# Patient Record
(demographics unavailable — no encounter records)

---

## 2024-10-20 NOTE — HISTORY OF PRESENT ILLNESS
[FreeTextEntry1] : 2 weeks of sinus infection treated with antibiotics and steroids  Then sudden development of right auricular/jaw pain with weakness of the right face, paresthesias of the tongue, pain in the ear and pain radiating into the right neck.  1 week in [facility] [place] hospital with antibiotics and steroids.   Right lower face, neck muscles with fasciculations. Significant right ear pain.

## 2024-10-20 NOTE — DISCUSSION/SUMMARY
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  I have personally reviewed available neuroradiological images. MRI head 10/2023 w/wo contrast - no acute findings, no significant enhancement of the IAC or cranial nerves seen. Parotid gland is only partially visualized.  CTA head and neck - no significant abnormalities.   Historical (From [facility] note 8/8/2023): Nutcracker syndrome (open left renal vein transposition to IVC, venoplasty left renal vein anastomosis to IVC and coil embolization/sclerotherapy of left ovarian vein and pelvic varices, coil left ovarian vein, bilateral uterine artery particle embolization) TTE 5/2023: Normal atrial size, hypermobile interatrial septum, bubble study negative for PFO. RUPAL 6/28/20203 - Small amount of bubbles in LA, small PFO.  8/2023- s/p PFO closure. Started on estrogen as well. Improved migraines.   Historical neuro: Hypercoagulable testing was initiated and completed in the hospital setting.  SAI negative, lupus anticoagulant negative DRVVT negative, silica clotting time negative, beta-2 glycoprotein negative, factor V Leiden, prothrombin gene, protein C and S, Antithrombin III all negative/normal.  Differential considerations for ear pain/facial pain/fasciculations drooping - appears peripheral and less likely central - hemifacial spasm, epileptiform etiology, autoimmune related, peripheral compressive or inflammatory neuropathy, infectious related, paraneoplastic.   RECOMMENDATION; 1. Check TSH, T4, thyroglobulin and thyroid peroxidase antibodies, SAI, CRP, ESR.  2. EEG  3. MRI neck soft tissues 4. MRI cervical spine 5. ENT eval 6. Gabapentin 300mg TID 7. Carbamazepine 200mg daily is reasonable (she had this prescribed in the past for trigeminal neuralgia).   Patient instructed to seek medical attention if symptoms worsening.     Follow up 2 weeks.

## 2024-10-20 NOTE — DISCUSSION/SUMMARY
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  I have personally reviewed available neuroradiological images. MRI head 10/2023 w/wo contrast - no acute findings, no significant enhancement of the IAC or cranial nerves seen. Parotid gland is only partially visualized.  CTA head and neck - no significant abnormalities.   Historical (From [facility] note 8/8/2023): Nutcracker syndrome (open left renal vein transposition to IVC, venoplasty left renal vein anastomosis to IVC and coil embolization/sclerotherapy of left ovarian vein and pelvic varices, coil left ovarian vein, bilateral uterine artery particle embolization) TTE 5/2023: Normal atrial size, hypermobile interatrial septum, bubble study negative for PFO. RUPAL 6/28/20203 - Small amount of bubbles in LA, small PFO.  8/2023- s/p PFO closure. Started on estrogen as well. Improved migraines.   Historical neuro: Hypercoagulable testing was initiated and completed in the hospital setting.  SAI negative, lupus anticoagulant negative DRVVT negative, silica clotting time negative, beta-2 glycoprotein negative, factor V Leiden, prothrombin gene, protein C and S, Antithrombin III all negative/normal.  Differential considerations for ear pain/facial pain/fasciculations drooping - appears peripheral and less likely central - hemifacial spasm, epileptiform etiology, autoimmune related, peripheral compressive or inflammatory neuropathy, infectious related, paraneoplastic.   RECOMMENDATION; 1. Check TSH, T4, thyroglobulin and thyroid peroxidase antibodies, SAI, CRP, ESR.  2. EEG  3. MRI neck soft tissues 4. MRI cervical spine 5. ENT eval 6. Gabapentin 300mg TID 7. Carbamazepine 200mg daily is reasonable (she had this prescribed in the past for trigeminal neuralgia).   Patient instructed to seek medical attention if symptoms worsening.     Follow up 2 weeks.

## 2024-10-20 NOTE — PHYSICAL EXAM
[FreeTextEntry1] : GENERAL APPEARANCE: Well developed, well nourished woman in pain CARDIOVASCULAR: Regular rate and rhythm HEENT: right lower facial fasciulations, right scalene/platisma fasciculations observed. Also noted skin blotching on right lower face and neck. fullness of neck at anterior base soft tissues. Mild tenderness to palpation of the right tragus and right ear. Otoscope examination of right external auditory canal is unremarkable.    NEUROLOGIC EXAM: MENTAL STATUS: Alert and Oriented to person, place and time. Speech is fluent, without aphasia or dysarthria Behavior and affect appropriate to situation.                         CRANIAL NERVES: CN 2:    Visual fields are full to confrontation. CN 3, 4, 6: Extraocular movements are intact. No nystagmus or ophthalmoplegia is evident. Pupils are equally round and reactive to light. CN 5:     Facial sensation is intact to light touch in all 3 divisions CN 7:     Facial excursion is full and symmetric bilaterally, but at rest with mild facial droop.  CN 8:   Hearing intact OU to finger rub.  MOTOR: Strength is 5/5 throughout for age and stature. No orbiting or drift noted. SENSORY: Intact to light touch perception in all four extremities without extinction to double simultaneous stimulation COORD: Finger to nose testing without dysmetria bilaterally. GAIT: Normal station and gait.

## 2024-10-20 NOTE — HISTORY OF PRESENT ILLNESS
[FreeTextEntry1] : 2 weeks of sinus infection treated with antibiotics and steroids  Then sudden development of right auricular/jaw pain with weakness of the right face, paresthesias of the tongue, pain in the ear and pain radiating into the right neck.  1 week in [facility] with antibiotics and steroids.   Right lower face, neck muscles with fasciculations. Significant right ear pain.

## 2024-10-30 NOTE — PHYSICAL EXAM
[FreeTextEntry1] : GENERAL PHYSICAL EXAM: GEN: no distress, normal affect EYES: sclera white, conjunctiva clear, no nystagmus PULM: no respiratory distress EXT: no edema, no cyanosis MSK: muscle tone and strength normal SKIN: warm, dry, no rash or lesion on exposed skin   NEUROLOGICAL EXAM: Mental Status Orientation: alert and oriented to person, place, time, and situation Language: clear and fluent, intact comprehension and repetition   Cranial Nerves II: visual fields full to confrontation III, IV, VI: PERRL, EOMI V, VII: facial sensation and movement intact, altered sensation on R side along V1, and V2, intermittent twitching at corner of the mouth as well as eyelid myokymia  VIII: hearing intact IX, X: uvula midline, soft palate elevates normally XI: BL shoulder shrug intact XII: tongue midline   Motor Bilateral muscle strength 5/5 in UE and LE, proximally and distally Tone and bulk are normal in upper and lower limbs   Sensation Intact to light touch in all 4 EXTs  Coordination Normal FTN bilaterally   Gait Normal stance, stride, and pivot turn

## 2024-10-30 NOTE — HISTORY OF PRESENT ILLNESS
[FreeTextEntry1] : Oct 30: Patient here for follow-up of trigeminal neuralgia.  She was last seen January 2024 by Samra Collins.  She started on carbamazepine 100 mg every 12 hours, prednisone taper, and completed course of Valtrex. About a month ago she had a sinus infection which triggered her facial pain. She had facial drooping. She went to the ER  and was treated for stroke again with thrombolytic. She had another course of steroids. She was seen at [facility] by Dr. Eduardo. She had a spinal tap and MRIs. She will still sometimes get twitching, pulling sensation, ear pain deep inside, fullness. She feels like her throat is swollen, will feel a lump on the right side, has difficulty swallowing. She has videos of right sided neck twitching particularly when opening the mouth or protruding her tongue , redness on the side of her face and neck. When bad things can sound muffled on the right side, or will have sounds that usually aren't there. There can also be excessive dryness or salivation on the right side with attacks. There can also be blurred vision.  She had kidney transplant surgery for Nutcracker syndrome in July. 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    HPI per chart: Fifi Alcala is a 41YO female, presenting today with trigeminal neuralgia.  Past medical history of CVA and PFO with closure.  Patient reports episode of trigeminal neuralgia and migraines occurring just after stroke. Patient reports that 2 weeks ago she received trigger point injections from pain management with Dr. Almonte, for head and neck pain and migraines.  Later that day she developed severe trigeminal neuralgia on the right side of the head starting at the ear getting to the face.  Patient was unaware of what it was at that time and pain after few days of the pain went to the emergency room for fear of second stroke.  Workup was negative and was told that it may be the start of shingles.  The next day the pain was really bad and she felt like her ear was red so she went to the walk-in who started her on Valtrex in case it was shingles.  Patient reports no rashes or blisters at any point.  She is currently taking gabapentin and Percocet for the pain but has not noted any improvement.  Pain is localized to the back of the right side of the head going across the top and across the ear into the cheek.  Pain does not extend down to the jaw.  Pain is a shooting stabbing pain.  Patient also reports that she had a recent sinus infection and was on a course of amoxicillin for this that is complete at this time. No other neurological complaints at this time. Pt denies any stroke like symptoms, including facial droop, numbness or weakness.

## 2024-10-30 NOTE — ASSESSMENT
[FreeTextEntry1] : Nina Wilhelm is a 40-year-old female presenting today with right sided facial pain, ear pain, and intermittent eye, neck and mouth twitching.  Patient does have a past medical history of CVA with PFO with closure.  Patient had workup for secondary stroke which was negative.  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 MRI brain, CT cervical spine and soft tissue neck were reviewed and did not show any abnormal lesion or vascular abnormality. There was left sided foraminal stenosis.      Plan: - No further imaging of benefit - amitriptyline start 10mg  , gradually increase to as tolerated. Max 50mg.  - Gabapentin 600mg as needed.  - Medrol dose pack for flair.  - Follow-up in 2 months.       Discussed common side effects of prescribed medications and potential alternatives.          Counseled patient on the importance of maintaining a healthy lifestyle, including balanced diet, adequate hydration, stress management, proper sleep hygiene, and physical activity.  Answered all questions and concerns to the best of my ability. Advised to call for any new or worsening symptoms.      In order to maintain continuity of care/prescription refills, patients must be seen on a yearly basis.   Total time spent on the day of the visit, including pre-visit and post-visit time was 35 minutes.

## 2024-11-26 NOTE — HISTORY OF PRESENT ILLNESS
[LMP unknown] : LMP unknown [N] : Patient does not use contraception [Y] : Positive pregnancy history [unknown] : Patient is unsure of the date of her LMP [Currently Active] : currently active [FreeTextEntry1] : Fifi is her for follow up on her need for HRT. She has surgical menopause after the total hysterectomy and BSO which was initially performed for severe pelvic congestion syndrome. She was subsequently diagnosed with Nutcracker syndrome, and she had her kidney repositioned. She is doing better but she has some right sided pain which may be adhesion related. She has significant vaginal dryness, and she will use Estrace cream as directed in the vulvovaginal area. She will remain on the Estradiol gel transdermal at 1.25 mg daily.  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 She will apply clobetasol as directed nightly for 4-6 weeks. We discussed the nature of LS and the tendency for it to wax and wane.  We discussed the use of a vibrator or vaginal dilator to help with accommodation.  All questions were answered. She does not need a pap test as he has no cervix and no prior h/o cervical disease.  During this visit comprehensive counseling was given regarding the following concerns:   1 We discussed the need for proper nutrition, hydration and exercise.  This includes cardiovascular and pelvic floor exercises.   2 We discussed the importance of maintaining a proper vaccination schedule and we discussed the utility of the currently available vaccines (Flu ,TDaP, Shingles, Covid 19 booster, RSV and Gardasil 9).   3 We discussed the impact of chronic sun exposure and the risk for skin disease as a result. The benefits of a total body scan by a Dermatologist were reviewed, along with sun-protective clothing, eye wear and sunblock.   4 We discussed the need for certain supplements for most people which include vitamin D3 (2000 IU daily), calcium rich foods with limitation on calcium supplementation by tabular form to 600 mg daily.  As part of a bone health program, we recommend weightbearing exercises, and some limited sun exposure (10-15 minutes daily) to help convert vitamin D to its active form.   5) We discussed the importance of an eye exam with dilation and retinal and corneal examination.   6) Mental health is very important and often times under served. We discussed the need for downtime, stress reduction and in the case of feelings such as profound sadness, suicidal or other unusual thoughts--the need to reach out for help.   7) Gun violence is a leading cause of death and injury in children, and it is important to focus on proper storage, gun locks and education of family members if there are guns in the home.   [Mammogramdate] : 11/13/2024 [TextBox_19] : BR1 [PapSmeardate] : 04/13/2021 [TextBox_31] : NEG [ColonoscopyDate] : 2023 [TextBox_43] : as per pt  [HPVDate] : 04/13/2021 [TextBox_78] : NEG [PGHxTotal] : 5 [[facility]] : 4 [[facility]] : 4 [PGHxABSpont] : 1

## 2024-11-26 NOTE — HISTORY OF PRESENT ILLNESS
[LMP unknown] : LMP unknown [N] : Patient does not use contraception [Y] : Positive pregnancy history [unknown] : Patient is unsure of the date of her LMP [Currently Active] : currently active [FreeTextEntry1] : Fifi is her for follow up on her need for HRT. She has surgical menopause after the total hysterectomy and BSO which was initially performed for severe pelvic congestion syndrome. She was subsequently diagnosed with Nutcracker syndrome, and she had her kidney repositioned. She is doing better but she has some right sided pain which may be adhesion related. She has significant vaginal dryness, and she will use Estrace cream as directed in the vulvovaginal area. She will remain on the Estradiol gel transdermal at 1.25 mg daily.  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 She will apply clobetasol as directed nightly for 4-6 weeks. We discussed the nature of LS and the tendency for it to wax and wane.  We discussed the use of a vibrator or vaginal dilator to help with accommodation.  All questions were answered. She does not need a pap test as he has no cervix and no prior h/o cervical disease.  During this visit comprehensive counseling was given regarding the following concerns:   1 We discussed the need for proper nutrition, hydration and exercise.  This includes cardiovascular and pelvic floor exercises.   2 We discussed the importance of maintaining a proper vaccination schedule and we discussed the utility of the currently available vaccines (Flu ,TDaP, Shingles, Covid 19 booster, RSV and Gardasil 9).   3 We discussed the impact of chronic sun exposure and the risk for skin disease as a result. The benefits of a total body scan by a Dermatologist were reviewed, along with sun-protective clothing, eye wear and sunblock.   4 We discussed the need for certain supplements for most people which include vitamin D3 (2000 IU daily), calcium rich foods with limitation on calcium supplementation by tabular form to 600 mg daily.  As part of a bone health program, we recommend weightbearing exercises, and some limited sun exposure (10-15 minutes daily) to help convert vitamin D to its active form.   5) We discussed the importance of an eye exam with dilation and retinal and corneal examination.   6) Mental health is very important and often times under served. We discussed the need for downtime, stress reduction and in the case of feelings such as profound sadness, suicidal or other unusual thoughts--the need to reach out for help.   7) Gun violence is a leading cause of death and injury in children, and it is important to focus on proper storage, gun locks and education of family members if there are guns in the home.   [Mammogramdate] : 11/13/2024 [TextBox_19] : BR1 [PapSmeardate] : 04/13/2021 [TextBox_31] : NEG [ColonoscopyDate] : 2023 [TextBox_43] : as per pt  [HPVDate] : 04/13/2021 [TextBox_78] : NEG [PGHxTotal] : 5 [[facility]] : 4 [[facility]xLiving] : 4 [PGHxABSpont] : 1

## 2024-12-09 NOTE — PHYSICAL EXAM
[Normal Breath Sounds] : Normal breath sounds [Normal Heart Sounds] : normal heart sounds [Anxious] : anxious [JVD] : no jugular venous distention  [de-identified] : Ambulating without difficulty [de-identified] : Within normal limits nonicteric [de-identified] : Soft moderate tenderness in the left lower quadrant no hernias present slight ecchymosis no peritoneal signs no masses wounds clean dry and intact [de-identified] : Full range of motion [de-identified] : Mild ecchymosis near incisions [de-identified] : Cranial nerves II through XII grossly intact

## 2024-12-09 NOTE — HISTORY OF PRESENT ILLNESS
[de-identified] : Fifi is a 39 y/o female here for a follow up. S/p repair of jejunal duodenal intussusception and ventral hernia repair x 2 on October 5, 2023 with Dr. Gramajo. Was doing well post up then began developing intermittent projectile vomiting of bilious fluid postprandially.  Saw Dr. Molina 10/2023 at which time a CT was ordered - negative Symptoms continued - went to ED - CT negative  CT A/P on 5/16/24: LOWER CHEST: Partially imaged atrial septal occlusion device. LIVER: Normal morphology. Unchanged subcentimeter hypodensity in the left lateral hepatic lobe, too small to characterize. BILE DUCTS: Normal caliber. GALLBLADDER: Cholecystectomy. SPLEEN: Within normal limits. PANCREAS: Within normal limits. ADRENALS: Bilateral adrenal gland nodules/nodular thickening is unchanged. KIDNEYS/URETERS: Within normal limits. BLADDER: Within normal limits. REPRODUCTIVE ORGANS: Hysterectomy. BOWEL: Duodenojejunal anastomosis. No bowel obstruction. Appendix is normal. PERITONEUM: No ascites. VESSELS: Left ovarian vein embolization material. Unchanged reduced aortomesenteric angle of the SMA in keeping with history of SMA syndrome. Post surgical changes interposed at the aorta and IVC at level of the left renal vein. Trace atherosclerotic changes of the infrarenal aorta. Mild collateralization of vessels in the ventral abdominal wall. RETROPERITONEUM/LYMPH NODES: No lymphadenopathy. ABDOMINAL WALL: Postsurgical changes of the ventral wall. BONES: Degenerative changes. IMPRESSION: No acute findings to explain patient's lower abdominal pain.  CT A/P on 12/23/23: BOWEL: Stable postsurgical changes of duodenojejunostomy. No bowel obstruction. Normal appendix. No acute pathology. No bowel obstruction or inflammation.  CT A/P 10/26/23: Interval mesh repair of ventral abdominal wall hernia compared to CT 10/2/2023. No abdominal wall hematoma or fluid collection. No acute postsurgical complication. Other findings as above without interval change. No intussusception.  PMHx: SMA syndrome s/p duodenojejunostomy, nutcracker syndrome s/p R renal vein transposition 7/2022 (Dr. Merrill), pelvic congestion syndrome s/p L gonadal vein and UAE, L ovarian vein embolization 8/2022 (Garfield Villalta) and CÉSAR/BSO 4/2023 w/ lap ligation of L ovarian vein (Dr. Strauss)

## 2024-12-09 NOTE — ASSESSMENT
[FreeTextEntry1] : 40-year-old female status post repair ventral hernias and intussusception 10/5/23 with Dr. Gramajo

## 2024-12-12 NOTE — HISTORY OF PRESENT ILLNESS
[de-identified] : Fifi is a 39 y/o female here for a follow up. S/p repair of jejunal duodenal intussusception and ventral hernia repair x 2 on October 5, 2023 with Dr. Gramajo. Was doing well post up then began developing intermittent projectile vomiting of bilious fluid postprandially.  Saw Dr. Molina 10/2023 at which time a CT was ordered - negative Symptoms continued - went to ED - CT negative  CT A/P on 5/16/24: LOWER CHEST: Partially imaged atrial septal occlusion device. LIVER: Normal morphology. Unchanged subcentimeter hypodensity in the left lateral hepatic lobe, too small to characterize. BILE DUCTS: Normal caliber. GALLBLADDER: Cholecystectomy. SPLEEN: Within normal limits. PANCREAS: Within normal limits. ADRENALS: Bilateral adrenal gland nodules/nodular thickening is unchanged. KIDNEYS/URETERS: Within normal limits. BLADDER: Within normal limits. REPRODUCTIVE ORGANS: Hysterectomy. BOWEL: Duodenojejunal anastomosis. No bowel obstruction. Appendix is normal. PERITONEUM: No ascites. VESSELS: Left ovarian vein embolization material. Unchanged reduced aortomesenteric angle of the SMA in keeping with history of SMA syndrome. Post surgical changes interposed at the aorta and IVC at level of the left renal vein. Trace atherosclerotic changes of the infrarenal aorta. Mild collateralization of vessels in the ventral abdominal wall. RETROPERITONEUM/LYMPH NODES: No lymphadenopathy. ABDOMINAL WALL: Postsurgical changes of the ventral wall. BONES: Degenerative changes. IMPRESSION: No acute findings to explain patient's lower abdominal pain.  CT A/P on 12/23/23: BOWEL: Stable postsurgical changes of duodenojejunostomy. No bowel obstruction. Normal appendix. No acute pathology. No bowel obstruction or inflammation.  CT A/P 10/26/23: Interval mesh repair of ventral abdominal wall hernia compared to CT 10/2/2023. No abdominal wall hematoma or fluid collection. No acute postsurgical complication. Other findings as above without interval change. No intussusception.  PMHx: SMA syndrome s/p duodenojejunostomy, nutcracker syndrome s/p R renal vein transposition 7/2022 (Dr. Merrill), pelvic congestion syndrome s/p L gonadal vein and UAE, L ovarian vein embolization 8/2022 (Garfield Villalta) and CÉSAR/BSO 4/2023 w/ lap ligation of L ovarian vein (Dr. Strauss)

## 2024-12-12 NOTE — PHYSICAL EXAM
[JVD] : no jugular venous distention  [de-identified] : Ambulating without difficulty [de-identified] : Within normal limits nonicteric [de-identified] : Soft moderate tenderness in the left lower quadrant no hernias present slight ecchymosis no peritoneal signs no masses wounds clean dry and intact [de-identified] : Full range of motion [de-identified] : Mild ecchymosis near incisions [de-identified] : Cranial nerves II through XII grossly intact

## 2025-02-25 NOTE — HISTORY OF PRESENT ILLNESS
[LMP unknown] : LMP unknown [N] : Patient does not use contraception [Y] : Positive pregnancy history [Mammogramdate] : 11/13/2024 [TextBox_19] : BR1 [PapSmeardate] : 04/13/2021 [TextBox_31] : NEG [ColonoscopyDate] : 2023 [TextBox_43] : as per pt  [GonorrheaDate] : 04/16/2024 [TextBox_63] : NEG [ChlamydiaDate] : 04/16/2024 [TextBox_68] : NEG [HPVDate] : 04/13/2021 [TextBox_78] : NEG [PGHxTotal] : 5 [[facility]] : 4 [[facility]] : 4 [PGHxABSpont] : 1 [unknown] : Patient is unsure of the date of her LMP [Currently Active] : currently active

## 2025-02-25 NOTE — PHYSICAL EXAM
[Chaperone Present] : A chaperone was present in the examining room during all aspects of the physical examination [FreeTextEntry2] : Lise Sequeira MA

## 2025-07-01 NOTE — REVIEW OF SYSTEMS
[Abdominal Pain] : abdominal pain [As Noted in HPI] : as noted in HPI [Negative] : Respiratory [Dysuria] : no dysuria

## 2025-07-01 NOTE — HISTORY OF PRESENT ILLNESS
[FreeTextEntry1] : This is a 41-year-old female who presents for vascular consult for pelvic pain.  Patient has a history of nutcracker syndrome, she underwent left renal vein transposition in 2022 and left coil embolization of the ovarian vein.  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  She was found to have a PFO and is status post closure.  She underwent autotransplant of kidney approximately 1 year ago and had the left ovarian vein coils extracted.  She was relatively asymptomatic until April when her symptoms returned, she complains of a "ripping" pain and tenderness across her pelvis.  She takes Gabapentin and pain medications as needed which gives her little relief.  She describes the pain as "flare ups" where the pain becomes extreme with associated urinary frequency and diarrhea. She also states her legs and feet turn purple and affects her ambulation.  She also endorses fatigue.   She underwent workup with several specialties including urology, gastroenterology as well as rheumatology many years ago.  Of note patient also underwent repair of jejunal duodenal intussusception and ventral hernia repair x 2 on October 5, 2023 with Dr. Gramajo. She recently went underwent a CTV of her abdomen pelvis at [place] about 3 to 4 weeks ago.

## 2025-07-01 NOTE — PHYSICAL EXAM
[Normal Breath Sounds] : Normal breath sounds [Normal Rate and Rhythm] : normal rate and rhythm [2+] : left 2+ [Ankle Swelling (On Exam)] : not present [Varicose Veins Of Lower Extremities] : not present [] : not present [de-identified] : alert and oriented in NAD [de-identified] : pelvic tenderness

## 2025-07-01 NOTE — PLAN
[TextEntry] : This a 41-year-old female who presents for 1 month history of pelvic pain.  Patient has extensive surgical history for nutcracker syndrome and pelvic congestion syndrome.  She underwent left renal vein transposition in 2022 with coil embolization of the left ovarian vein, total hysterectomy and oophorectomy complicated by stroke found to have PFO status post closure.  Most recently she underwent auto transplant of kidney with coil removal x 1 year ago.  She was asymptomatic until about April when her symptoms returned.  She describes it as flareups where she has severe lower pelvic pain described as "ripping", urinary frequency, diarrhea, discoloration of both legs and feet and heaviness in her legs.  Imaging of auto transplanted kidney today shows good flow and open renal vein without evidence of compression.  Will have her get images on a disk from their most recent CT venogram.  Consider venogram with IVUS.  Referral to rheumatology provided to patient given extents of symptoms and fatigue.

## 2025-07-18 NOTE — PHYSICAL EXAM
[TextEntry] : GENERAL: Appears in no acute distress HEENT: EOMI, PERRLA. No conjunctival erythema. Moist mucous membranes. No nasopharyngeal ulcers NECK: Supple, no cervical lymphadenopathy, no thyromegaly CARDIOVASCULAR: RRR. S1, S2 auscultated. No murmurs or rubs. PULMONARY: Clear to auscultation b/l, no wheezes, rales, or crackles ABDOMINAL: Soft, nontender, nondistended. No organomegaly. MSK: No active synovitis, swelling, erythema, or warmth. No joint tenderness to palpation. No deformities. SKIN: No lesions or rashes NEURO: No focal deficits. PSYCH: AAOx3. Normal affect and thought process.

## 2025-07-18 NOTE — HISTORY OF PRESENT ILLNESS
[FreeTextEntry1] : 40 y/o female referred to rheumatology for evaluation for autoimmune diseases.  Pt has recurrent numbness of faces, arms, legs. Neurology has said in past that this may be residual from stroke in 2023. Pt reports severe fatigue. Reports overall aches in the body 20-30 mins in AM and gelling. Pt has Hx of total hysterectomy and oophorectomy complicated by stroke and residual R facial droop. Pt has been having recurrence of pelvic pain with urinary frequency, diarrhea, legs/feet turning purple and numb which can last hours x 3 years. Pt had movement of kidney surgically in 7/2024 for nutcracker syndrome seen with improvement of the symptoms but symptoms started to return in 5/2025. Pt had CTV of A/P which reportedly did not show any compression and planned for venogram in the future but not done yet.

## 2025-07-18 NOTE — ASSESSMENT
[FreeTextEntry1] : 42 y/o female referred to rheumatology for evaluation for autoimmune diseases.  Pt has recurrent numbness of faces, arms, legs. Neurology has said in past that this may be residual from stroke in 2023. Pt reports severe fatigue. Reports overall aches in the body 20-30 mins in AM and gelling. Pt has Hx of total hysterectomy and oophorectomy complicated by stroke and residual R facial droop. Pt has been having recurrence of pelvic pain with urinary frequency, diarrhea, legs/feet turning purple and numb which can last hours x 3 years. Pt had movement of kidney surgically in 7/2024 for nutcracker syndrome seen with improvement of the symptoms but symptoms started to return in 5/2025. Pt had CTV of A/P which reportedly did not show any compression and planned for venogram in the future but not done yet.  Patient has Hx of nutcracker syndrome of renal vein which associated vascular symptoms of b/l LE which has improved with surgery but has returned recently. The diagnosis was based on vascular imaging which showed compression without signs of vascular inflammation in the associated vessel or any other vessels. Patient's current symptoms are the same symptoms that she previously had and CTV A/P reportedly did not show any new findings including signs of vasculitis. Pt's symptoms are not Raynaud's since they affect the entire LE rather than toes are foot. Only other major symptom pt has is severe fatigue, which is not specific.  At this point, without having any imaging findings or symptoms concerning for vasculitis or other autoimmune diseases, with full vascular workup not completed (e.g. venogram), I believe looking for systemic autoimmune condition in labwork is premature, as any abnormal labwork cannot be matched to any of existing symptoms/findings to increase our concerns for any specific autoimmune conditions. Pt should continue evaluation for vascular causes of her current symptoms. If any workup findings concerning for vasculitis or other signs of systemic autoimmune diseases, patient should return to discuss any further workup by me.  Pt understood and grateful for the discussion.